# Patient Record
Sex: MALE | Race: WHITE | NOT HISPANIC OR LATINO | Employment: FULL TIME | URBAN - METROPOLITAN AREA
[De-identification: names, ages, dates, MRNs, and addresses within clinical notes are randomized per-mention and may not be internally consistent; named-entity substitution may affect disease eponyms.]

---

## 2022-02-08 ENCOUNTER — TREATMENT (OUTPATIENT)
Dept: PHYSICAL THERAPY | Facility: CLINIC | Age: 62
End: 2022-02-08

## 2022-02-08 ENCOUNTER — TRANSCRIBE ORDERS (OUTPATIENT)
Dept: PHYSICAL THERAPY | Facility: CLINIC | Age: 62
End: 2022-02-08

## 2022-02-08 DIAGNOSIS — S67.21XA CRUSH INJURY OF HAND, RIGHT, INITIAL ENCOUNTER: Primary | ICD-10-CM

## 2022-02-08 DIAGNOSIS — Z47.89 ORTHOPEDIC AFTERCARE: ICD-10-CM

## 2022-02-08 DIAGNOSIS — S67.192A CRUSHING INJURY OF RIGHT MIDDLE FINGER, INITIAL ENCOUNTER: Primary | ICD-10-CM

## 2022-02-08 PROCEDURE — L3933 FO W/O JOINTS CF: HCPCS | Performed by: PHYSICAL THERAPIST

## 2022-02-09 NOTE — PROGRESS NOTES
Jonathan Jorge L 1960   Diagnosis/ Surgery: R long finger crush injury               Date Of Injury: 1/27/22    Date Of Surgery:1/28/22    Hand Dominance: Right   History of Present Condition: smashed between metal door and pallet   Medical/Vocational History/ Medications: WC: [employer]     Pain:  Minimal     Edema: moderate   Sensibility: WNL   Wound Status: volar distal pad, nail   ROM/ Strength: NT     Splinting:  · Patient was measure and fit with a custom fabricated bivalved mallet finger splint.    · Patient was instructed in wearing schedule, precautions and care of the splint during this visit.   · Patient was instructed in proper donning/doffing of splint.   Assessment:  · Patient was fitted and appropriate splint was fabricated this date.  · Patient reported that splint was comfortable and had no complications with the fit of the splint.  · Patient was instructed and patient verbalized understanding of precautions, wear and care of the splint.   · Patient demonstrated independent donning/doffing of splint during treatment today.  Goals:  · Patient was fitted properly with appropriate splint for diagnosis  · Patient was educated on precautions, wear schedule and care of splint  · Patient demonstrated independence with donning/doffing of the splint.  · Splint was provided to Protect Healing Structures, Restrict Mobility, Improve joint alignment.  Plan:  · No additional treatment is required for this patient at this time. The patient is therefore discharged from therapy.  · Patient advised to contact therapist with any additional questions or concerns regarding the fit and function of the splint.  · Patient will be seen for splint issues as needed   · Wear Instructions: Off for hygiene           PT SIGNATURE: Alyssa Monzon, PT   DATE TREATMENT INITIATED: 2/9/2022    Initial Certification  Certification Period: 5/10/2022  I certify that the therapy services are furnished while this patient is under my care.   The services outlined above are required by this patient, and will be reviewed every 90 days.     PHYSICIAN: Shannon Fraire MD      DATE:     Please sign and return via fax to [phone].. Thank you, [facility].